# Patient Record
Sex: FEMALE | Race: WHITE | NOT HISPANIC OR LATINO | Employment: PART TIME | URBAN - NONMETROPOLITAN AREA
[De-identification: names, ages, dates, MRNs, and addresses within clinical notes are randomized per-mention and may not be internally consistent; named-entity substitution may affect disease eponyms.]

---
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
## 2023-09-01 DIAGNOSIS — R10.2 PELVIC PAIN: Primary | ICD-10-CM

## 2023-09-07 ENCOUNTER — HOSPITAL ENCOUNTER (OUTPATIENT)
Dept: ULTRASOUND IMAGING | Age: 18
Discharge: HOME | End: 2023-09-07
Payer: OTHER GOVERNMENT

## 2023-09-07 DIAGNOSIS — R10.2 PELVIC PAIN: ICD-10-CM

## 2023-09-07 PROCEDURE — 76830 TRANSVAGINAL US NON-OB: CPT

## 2024-06-04 ENCOUNTER — OFFICE VISIT (OUTPATIENT)
Age: 19
End: 2024-06-04
Payer: OTHER GOVERNMENT

## 2024-06-04 VITALS
DIASTOLIC BLOOD PRESSURE: 78 MMHG | BODY MASS INDEX: 30.29 KG/M2 | HEIGHT: 67 IN | TEMPERATURE: 97.7 F | RESPIRATION RATE: 20 BRPM | HEART RATE: 96 BPM | OXYGEN SATURATION: 97 % | WEIGHT: 193 LBS | SYSTOLIC BLOOD PRESSURE: 138 MMHG

## 2024-06-04 DIAGNOSIS — Z75.8 DOES NOT HAVE PRIMARY CARE PROVIDER: ICD-10-CM

## 2024-06-04 DIAGNOSIS — L50.9 URTICARIA: Primary | ICD-10-CM

## 2024-06-04 PROCEDURE — 99213 OFFICE O/P EST LOW 20 MIN: CPT

## 2024-06-04 RX ORDER — DULOXETIN HYDROCHLORIDE 60 MG/1
60 CAPSULE, DELAYED RELEASE ORAL DAILY
COMMUNITY

## 2024-06-04 RX ORDER — BUSPIRONE HYDROCHLORIDE 5 MG/1
5 TABLET ORAL 3 TIMES DAILY
COMMUNITY

## 2024-06-04 RX ORDER — METHYLPREDNISOLONE 4 MG/1
TABLET ORAL
Qty: 1 KIT | Refills: 0 | Status: SHIPPED | OUTPATIENT
Start: 2024-06-04 | End: 2024-06-10

## 2024-06-04 ASSESSMENT — ENCOUNTER SYMPTOMS
WHEEZING: 0
COUGH: 0
SHORTNESS OF BREATH: 0
STRIDOR: 0

## 2024-06-04 NOTE — PATIENT INSTRUCTIONS
- Medrol dose pack sent to the pharmacy, take as directed. Side effects discussed.  - OTC Zyrtec and Benadryl as needed for itching.  - Avoid allergy trigger if possible.  - May take OTC pepcid for itching.  - Referral placed to PC for establishment of care, may need allergy testing due to reoccurring hives.  - Return to the clinic or follow up with PCP if symptoms worsen or fail to improve.

## 2024-06-04 NOTE — PROGRESS NOTES
Referral Reason:   Specialty Services Required     Number of Visits Requested:   1       No results found for this visit on 06/04/24.    Orders Placed This Encounter   Medications    methylPREDNISolone (MEDROL DOSEPACK) 4 MG tablet     Sig: Take by mouth.     Dispense:  1 kit     Refill:  0        Patient Instructions   - Medrol dose pack sent to the pharmacy, take as directed. Side effects discussed.  - OTC Zyrtec and Benadryl as needed for itching.  - Avoid allergy trigger if possible.  - May take OTC pepcid for itching.  - Referral placed to  for establishment of care, may need allergy testing due to reoccurring hives.  - Return to the clinic or follow up with PCP if symptoms worsen or fail to improve.      Electronically signed by YARELIS Sr CNP on 6/4/2024 at 11:54 AM

## 2024-06-05 ENCOUNTER — OFFICE VISIT (OUTPATIENT)
Dept: PRIMARY CARE CLINIC | Age: 19
End: 2024-06-05
Payer: OTHER GOVERNMENT

## 2024-06-05 VITALS
HEART RATE: 94 BPM | TEMPERATURE: 97.4 F | SYSTOLIC BLOOD PRESSURE: 124 MMHG | OXYGEN SATURATION: 98 % | BODY MASS INDEX: 30.29 KG/M2 | WEIGHT: 193 LBS | HEIGHT: 67 IN | DIASTOLIC BLOOD PRESSURE: 70 MMHG

## 2024-06-05 DIAGNOSIS — L25.9 CONTACT DERMATITIS, UNSPECIFIED CONTACT DERMATITIS TYPE, UNSPECIFIED TRIGGER: ICD-10-CM

## 2024-06-05 DIAGNOSIS — F31.32 BIPOLAR AFFECTIVE DISORDER, CURRENTLY DEPRESSED, MODERATE (HCC): ICD-10-CM

## 2024-06-05 DIAGNOSIS — Z00.00 ENCOUNTER FOR ANNUAL PHYSICAL EXAM: Primary | ICD-10-CM

## 2024-06-05 PROCEDURE — 99395 PREV VISIT EST AGE 18-39: CPT | Performed by: NURSE PRACTITIONER

## 2024-06-05 PROCEDURE — 99213 OFFICE O/P EST LOW 20 MIN: CPT | Performed by: NURSE PRACTITIONER

## 2024-06-05 RX ORDER — TRIAMCINOLONE ACETONIDE 0.25 MG/G
CREAM TOPICAL
Qty: 1 EACH | Refills: 0 | Status: SHIPPED | OUTPATIENT
Start: 2024-06-05

## 2024-06-05 SDOH — ECONOMIC STABILITY: FOOD INSECURITY: WITHIN THE PAST 12 MONTHS, THE FOOD YOU BOUGHT JUST DIDN'T LAST AND YOU DIDN'T HAVE MONEY TO GET MORE.: NEVER TRUE

## 2024-06-05 SDOH — ECONOMIC STABILITY: FOOD INSECURITY: WITHIN THE PAST 12 MONTHS, YOU WORRIED THAT YOUR FOOD WOULD RUN OUT BEFORE YOU GOT MONEY TO BUY MORE.: NEVER TRUE

## 2024-06-05 SDOH — ECONOMIC STABILITY: HOUSING INSECURITY
IN THE LAST 12 MONTHS, WAS THERE A TIME WHEN YOU DID NOT HAVE A STEADY PLACE TO SLEEP OR SLEPT IN A SHELTER (INCLUDING NOW)?: NO

## 2024-06-05 SDOH — ECONOMIC STABILITY: INCOME INSECURITY: HOW HARD IS IT FOR YOU TO PAY FOR THE VERY BASICS LIKE FOOD, HOUSING, MEDICAL CARE, AND HEATING?: NOT VERY HARD

## 2024-06-05 ASSESSMENT — ENCOUNTER SYMPTOMS
RHINORRHEA: 0
VOMITING: 0
COUGH: 0
COLOR CHANGE: 0
BACK PAIN: 0
SHORTNESS OF BREATH: 0
NAUSEA: 0
VOICE CHANGE: 0
PHOTOPHOBIA: 0

## 2024-06-05 ASSESSMENT — PATIENT HEALTH QUESTIONNAIRE - PHQ9
2. FEELING DOWN, DEPRESSED OR HOPELESS: SEVERAL DAYS
9. THOUGHTS THAT YOU WOULD BE BETTER OFF DEAD, OR OF HURTING YOURSELF: NOT AT ALL
SUM OF ALL RESPONSES TO PHQ QUESTIONS 1-9: 5
1. LITTLE INTEREST OR PLEASURE IN DOING THINGS: SEVERAL DAYS
6. FEELING BAD ABOUT YOURSELF - OR THAT YOU ARE A FAILURE OR HAVE LET YOURSELF OR YOUR FAMILY DOWN: NOT AT ALL
7. TROUBLE CONCENTRATING ON THINGS, SUCH AS READING THE NEWSPAPER OR WATCHING TELEVISION: SEVERAL DAYS
SUM OF ALL RESPONSES TO PHQ9 QUESTIONS 1 & 2: 2
10. IF YOU CHECKED OFF ANY PROBLEMS, HOW DIFFICULT HAVE THESE PROBLEMS MADE IT FOR YOU TO DO YOUR WORK, TAKE CARE OF THINGS AT HOME, OR GET ALONG WITH OTHER PEOPLE: SOMEWHAT DIFFICULT
4. FEELING TIRED OR HAVING LITTLE ENERGY: SEVERAL DAYS
5. POOR APPETITE OR OVEREATING: NOT AT ALL
SUM OF ALL RESPONSES TO PHQ QUESTIONS 1-9: 5
8. MOVING OR SPEAKING SO SLOWLY THAT OTHER PEOPLE COULD HAVE NOTICED. OR THE OPPOSITE, BEING SO FIGETY OR RESTLESS THAT YOU HAVE BEEN MOVING AROUND A LOT MORE THAN USUAL: NOT AT ALL
3. TROUBLE FALLING OR STAYING ASLEEP: SEVERAL DAYS

## 2024-06-05 NOTE — PATIENT INSTRUCTIONS
Begin claritin or zyrtec daily. May take benadryl as needed.   Complete medrol dose pack.   Triamcinolone cream as needed to affected areas.  Follow-up in 2 weeks if rash continues.   Fasting labs in 2 weeks in [address].

## 2024-06-05 NOTE — PROGRESS NOTES
10/04/2024    Depression Monitoring  06/05/2025    Hib vaccine  Aged Out    Meningococcal (ACWY) vaccine  Aged Out    Pneumococcal 0-64 years Vaccine  Aged Out    A1C test (Diabetic or Prediabetic)  Discontinued    Measles,Mumps,Rubella (MMR) vaccine  Discontinued    Varicella vaccine  Discontinued    HIV screen  Discontinued       Subjective:      Review of Systems   Constitutional:  Negative for chills and fever.   HENT:  Negative for ear pain, hearing loss, rhinorrhea and voice change.    Eyes:  Negative for photophobia and visual disturbance.   Respiratory:  Negative for cough and shortness of breath.    Cardiovascular:  Negative for chest pain and palpitations.   Gastrointestinal:  Negative for nausea and vomiting.   Endocrine: Negative.  Negative for cold intolerance and heat intolerance.   Genitourinary:  Negative for difficulty urinating and flank pain.   Musculoskeletal:  Negative for back pain and neck pain.   Skin:  Negative for color change and rash.   Allergic/Immunologic: Negative for environmental allergies and food allergies.   Neurological:  Negative for dizziness, speech difficulty and headaches.   Hematological:  Does not bruise/bleed easily.   Psychiatric/Behavioral:  Negative for sleep disturbance and suicidal ideas.        Objective:     Physical Exam  Vitals and nursing note reviewed.   Constitutional:       Appearance: She is well-developed.   HENT:      Head: Atraumatic.      Right Ear: External ear normal.      Left Ear: External ear normal.      Nose: Nose normal.   Eyes:      Conjunctiva/sclera: Conjunctivae normal.      Pupils: Pupils are equal, round, and reactive to light.   Cardiovascular:      Rate and Rhythm: Normal rate and regular rhythm.      Heart sounds: Normal heart sounds, S1 normal and S2 normal.   Pulmonary:      Effort: Pulmonary effort is normal.      Breath sounds: Normal breath sounds.   Abdominal:      General: Bowel sounds are normal.      Palpations: Abdomen is soft.

## 2024-07-09 ENCOUNTER — TELEPHONE (OUTPATIENT)
Dept: PRIMARY CARE CLINIC | Age: 19
End: 2024-07-09

## 2024-12-05 ENCOUNTER — TELEPHONE (OUTPATIENT)
Dept: PRIMARY CARE CLINIC | Age: 19
End: 2024-12-05

## 2024-12-05 NOTE — TELEPHONE ENCOUNTER
**contacted patient to rs due to provider being out of office. No answer. Left vm to return call and reschedule**

## (undated) DEVICE — ULTRACLEAN ACCESSORY ELECTRODE 4" (10.16 CM) COATED BLADE WITH EXTENDED INSULATION: Brand: ULTRACLEAN

## (undated) DEVICE — NEEDLE HYPO 18GA L1.5IN PNK POLYPR HUB S STL REG BVL STR

## (undated) DEVICE — DEFOGGER!" ANTI FOG KIT: Brand: DEROYAL

## (undated) DEVICE — MASK ANES CHILD SM SZ 4 SUP ERGO RND STYL FLX ULT THN

## (undated) DEVICE — PEDIATRIC ANESTHESIA 40 IN. CI: Brand: MEDLINE INDUSTRIES, INC.

## (undated) DEVICE — SUTURE VCRL SZ 3-0 L27IN ABSRB UD L26MM SH 1/2 CIR J416H

## (undated) DEVICE — SPONGE: SPECIALTY TONSIL XR LG 100/CS: Brand: MEDICAL ACTION INDUSTRIES

## (undated) DEVICE — COAGULATOR SUCTION BOVIE 6IN 10FR

## (undated) DEVICE — 9165 UNIVERSAL PATIENT PLATE: Brand: 3M™

## (undated) DEVICE — TOWEL,OR,DSP,ST,BLUE,STD,4/PK,20PK/CS: Brand: MEDLINE

## (undated) DEVICE — PAD,EYE,1-5/8X2 5/8,STERILE,LF,1/PK: Brand: MEDLINE

## (undated) DEVICE — CONMED GOLDLINE ELECTROSURGICAL HANDPIECE, HAND CONTROLLED WITH BLADE ELECTRODE, BUTTON SWITCH, SAFETY HOLSTER AND 10 FT (3 M) CABLE: Brand: CONMED GOLDLINE

## (undated) DEVICE — NEEDLE HYPO 27GA L1.25IN GRY POLYPR HUB S STL REG BVL STR